# Patient Record
Sex: FEMALE | Race: WHITE | NOT HISPANIC OR LATINO | URBAN - METROPOLITAN AREA
[De-identification: names, ages, dates, MRNs, and addresses within clinical notes are randomized per-mention and may not be internally consistent; named-entity substitution may affect disease eponyms.]

---

## 2019-02-01 ENCOUNTER — OUTPATIENT (OUTPATIENT)
Dept: OUTPATIENT SERVICES | Facility: HOSPITAL | Age: 29
LOS: 1 days | End: 2019-02-01
Payer: COMMERCIAL

## 2019-02-01 ENCOUNTER — APPOINTMENT (OUTPATIENT)
Dept: ULTRASOUND IMAGING | Facility: CLINIC | Age: 29
End: 2019-02-01

## 2019-02-01 DIAGNOSIS — Z00.8 ENCOUNTER FOR OTHER GENERAL EXAMINATION: ICD-10-CM

## 2019-02-01 PROBLEM — Z00.00 ENCOUNTER FOR PREVENTIVE HEALTH EXAMINATION: Status: ACTIVE | Noted: 2019-02-01

## 2019-02-01 PROCEDURE — 76801 OB US < 14 WKS SINGLE FETUS: CPT | Mod: 26

## 2019-02-01 PROCEDURE — 76815 OB US LIMITED FETUS(S): CPT

## 2019-02-01 PROCEDURE — 76801 OB US < 14 WKS SINGLE FETUS: CPT

## 2019-02-01 PROCEDURE — 76817 TRANSVAGINAL US OBSTETRIC: CPT | Mod: 26

## 2019-02-01 PROCEDURE — 76817 TRANSVAGINAL US OBSTETRIC: CPT

## 2019-05-13 ENCOUNTER — OUTPATIENT (OUTPATIENT)
Dept: OUTPATIENT SERVICES | Facility: HOSPITAL | Age: 29
LOS: 1 days | End: 2019-05-13
Payer: COMMERCIAL

## 2019-05-13 ENCOUNTER — APPOINTMENT (OUTPATIENT)
Dept: ULTRASOUND IMAGING | Facility: CLINIC | Age: 29
End: 2019-05-13
Payer: COMMERCIAL

## 2019-05-13 DIAGNOSIS — Z00.8 ENCOUNTER FOR OTHER GENERAL EXAMINATION: ICD-10-CM

## 2019-05-13 PROCEDURE — 76856 US EXAM PELVIC COMPLETE: CPT

## 2019-05-13 PROCEDURE — 76830 TRANSVAGINAL US NON-OB: CPT

## 2019-05-13 PROCEDURE — 76830 TRANSVAGINAL US NON-OB: CPT | Mod: 26

## 2019-05-13 PROCEDURE — 76856 US EXAM PELVIC COMPLETE: CPT | Mod: 26

## 2019-05-16 ENCOUNTER — APPOINTMENT (OUTPATIENT)
Dept: OBGYN | Facility: CLINIC | Age: 29
End: 2019-05-16

## 2019-09-17 ENCOUNTER — OUTPATIENT (OUTPATIENT)
Dept: INPATIENT UNIT | Facility: HOSPITAL | Age: 29
LOS: 1 days | Discharge: HOME | End: 2019-09-17
Payer: COMMERCIAL

## 2019-09-17 ENCOUNTER — APPOINTMENT (OUTPATIENT)
Dept: ANTEPARTUM | Facility: CLINIC | Age: 29
End: 2019-09-17
Payer: COMMERCIAL

## 2019-09-17 ENCOUNTER — ASOB RESULT (OUTPATIENT)
Age: 29
End: 2019-09-17

## 2019-09-17 DIAGNOSIS — O26.899 OTHER SPECIFIED PREGNANCY RELATED CONDITIONS, UNSPECIFIED TRIMESTER: ICD-10-CM

## 2019-09-17 DIAGNOSIS — O47.9 FALSE LABOR, UNSPECIFIED: ICD-10-CM

## 2019-09-17 LAB
APPEARANCE UR: CLEAR — SIGNIFICANT CHANGE UP
BILIRUB UR-MCNC: NEGATIVE — SIGNIFICANT CHANGE UP
COLOR SPEC: YELLOW — SIGNIFICANT CHANGE UP
DIFF PNL FLD: NEGATIVE — SIGNIFICANT CHANGE UP
GLUCOSE UR QL: NEGATIVE MG/DL — SIGNIFICANT CHANGE UP
KETONES UR-MCNC: NEGATIVE — SIGNIFICANT CHANGE UP
LEUKOCYTE ESTERASE UR-ACNC: ABNORMAL
NITRITE UR-MCNC: NEGATIVE — SIGNIFICANT CHANGE UP
PH UR: 8 — SIGNIFICANT CHANGE UP (ref 5–8)
PROT UR-MCNC: NEGATIVE MG/DL — SIGNIFICANT CHANGE UP
SP GR SPEC: 1.01 — SIGNIFICANT CHANGE UP (ref 1.01–1.02)
UROBILINOGEN FLD QL: NEGATIVE MG/DL — SIGNIFICANT CHANGE UP

## 2019-09-17 PROCEDURE — G0463: CPT

## 2019-09-17 PROCEDURE — 76817 TRANSVAGINAL US OBSTETRIC: CPT

## 2019-09-17 PROCEDURE — 99213 OFFICE O/P EST LOW 20 MIN: CPT

## 2019-09-17 PROCEDURE — 87086 URINE CULTURE/COLONY COUNT: CPT

## 2019-09-17 PROCEDURE — 81001 URINALYSIS AUTO W/SCOPE: CPT

## 2019-09-17 PROCEDURE — 59025 FETAL NON-STRESS TEST: CPT

## 2019-09-18 LAB
CULTURE RESULTS: SIGNIFICANT CHANGE UP
SPECIMEN SOURCE: SIGNIFICANT CHANGE UP

## 2019-09-19 DIAGNOSIS — O26.899 OTHER SPECIFIED PREGNANCY RELATED CONDITIONS, UNSPECIFIED TRIMESTER: ICD-10-CM

## 2020-01-06 ENCOUNTER — INPATIENT (INPATIENT)
Facility: HOSPITAL | Age: 30
LOS: 2 days | Discharge: HOME | End: 2020-01-09
Attending: OBSTETRICS & GYNECOLOGY | Admitting: OBSTETRICS & GYNECOLOGY
Payer: COMMERCIAL

## 2020-01-06 VITALS — WEIGHT: 209.44 LBS

## 2020-01-06 DIAGNOSIS — Z34.90 ENCOUNTER FOR SUPERVISION OF NORMAL PREGNANCY, UNSPECIFIED, UNSPECIFIED TRIMESTER: ICD-10-CM

## 2020-01-06 PROCEDURE — 86780 TREPONEMA PALLIDUM: CPT

## 2020-01-06 PROCEDURE — 59050 FETAL MONITOR W/REPORT: CPT

## 2020-01-06 PROCEDURE — 85025 COMPLETE CBC W/AUTO DIFF WBC: CPT

## 2020-01-06 PROCEDURE — 85018 HEMOGLOBIN: CPT

## 2020-01-06 PROCEDURE — 85014 HEMATOCRIT: CPT

## 2020-01-06 PROCEDURE — 86900 BLOOD TYPING SEROLOGIC ABO: CPT

## 2020-01-06 PROCEDURE — 86901 BLOOD TYPING SEROLOGIC RH(D): CPT

## 2020-01-06 PROCEDURE — 94760 N-INVAS EAR/PLS OXIMETRY 1: CPT

## 2020-01-06 PROCEDURE — 86850 RBC ANTIBODY SCREEN: CPT

## 2020-01-06 PROCEDURE — 36415 COLL VENOUS BLD VENIPUNCTURE: CPT

## 2020-01-06 PROCEDURE — C1889: CPT

## 2020-01-06 RX ORDER — SODIUM CHLORIDE 9 MG/ML
1000 INJECTION, SOLUTION INTRAVENOUS
Refills: 0 | Status: DISCONTINUED | OUTPATIENT
Start: 2020-01-06 | End: 2020-01-07

## 2020-01-06 RX ORDER — OXYTOCIN 10 UNIT/ML
333.33 VIAL (ML) INJECTION
Qty: 20 | Refills: 0 | Status: COMPLETED | OUTPATIENT
Start: 2020-01-06 | End: 2020-01-07

## 2020-01-06 RX ORDER — CITRIC ACID/SODIUM CITRATE 300-500 MG
15 SOLUTION, ORAL ORAL EVERY 6 HOURS
Refills: 0 | Status: DISCONTINUED | OUTPATIENT
Start: 2020-01-06 | End: 2020-01-07

## 2020-01-06 RX ORDER — OXYTOCIN 10 UNIT/ML
2 VIAL (ML) INJECTION
Qty: 30 | Refills: 0 | Status: DISCONTINUED | OUTPATIENT
Start: 2020-01-06 | End: 2020-01-09

## 2020-01-06 RX ADMIN — SODIUM CHLORIDE 125 MILLILITER(S): 9 INJECTION, SOLUTION INTRAVENOUS at 22:59

## 2020-01-06 RX ADMIN — Medication 2 MILLIUNIT(S)/MIN: at 23:07

## 2020-01-07 LAB
BASOPHILS # BLD AUTO: 0.03 K/UL — SIGNIFICANT CHANGE UP (ref 0–0.2)
BASOPHILS NFR BLD AUTO: 0.3 % — SIGNIFICANT CHANGE UP (ref 0–2)
EOSINOPHIL # BLD AUTO: 0.13 K/UL — SIGNIFICANT CHANGE UP (ref 0–0.5)
EOSINOPHIL NFR BLD AUTO: 1.3 % — SIGNIFICANT CHANGE UP (ref 0–6)
HCT VFR BLD CALC: 33.6 % — LOW (ref 34.5–45)
HGB BLD-MCNC: 11.1 G/DL — LOW (ref 11.5–15.5)
IMM GRANULOCYTES NFR BLD AUTO: 0.4 % — SIGNIFICANT CHANGE UP (ref 0–1.5)
LYMPHOCYTES # BLD AUTO: 1.91 K/UL — SIGNIFICANT CHANGE UP (ref 1–3.3)
LYMPHOCYTES # BLD AUTO: 19.8 % — SIGNIFICANT CHANGE UP (ref 13–44)
MCHC RBC-ENTMCNC: 29.4 PG — SIGNIFICANT CHANGE UP (ref 27–34)
MCHC RBC-ENTMCNC: 33 GM/DL — SIGNIFICANT CHANGE UP (ref 32–36)
MCV RBC AUTO: 88.9 FL — SIGNIFICANT CHANGE UP (ref 80–100)
MONOCYTES # BLD AUTO: 0.77 K/UL — SIGNIFICANT CHANGE UP (ref 0–0.9)
MONOCYTES NFR BLD AUTO: 8 % — SIGNIFICANT CHANGE UP (ref 2–14)
NEUTROPHILS # BLD AUTO: 6.75 K/UL — SIGNIFICANT CHANGE UP (ref 1.8–7.4)
NEUTROPHILS NFR BLD AUTO: 70.2 % — SIGNIFICANT CHANGE UP (ref 43–77)
PLATELET # BLD AUTO: 333 K/UL — SIGNIFICANT CHANGE UP (ref 150–400)
RBC # BLD: 3.78 M/UL — LOW (ref 3.8–5.2)
RBC # FLD: 12.9 % — SIGNIFICANT CHANGE UP (ref 10.3–14.5)
T PALLIDUM AB TITR SER: NEGATIVE — SIGNIFICANT CHANGE UP
WBC # BLD: 9.63 K/UL — SIGNIFICANT CHANGE UP (ref 3.8–10.5)
WBC # FLD AUTO: 9.63 K/UL — SIGNIFICANT CHANGE UP (ref 3.8–10.5)

## 2020-01-07 RX ORDER — LANOLIN
1 OINTMENT (GRAM) TOPICAL EVERY 6 HOURS
Refills: 0 | Status: DISCONTINUED | OUTPATIENT
Start: 2020-01-07 | End: 2020-01-09

## 2020-01-07 RX ORDER — GLYCERIN ADULT
1 SUPPOSITORY, RECTAL RECTAL AT BEDTIME
Refills: 0 | Status: DISCONTINUED | OUTPATIENT
Start: 2020-01-07 | End: 2020-01-07

## 2020-01-07 RX ORDER — OXYCODONE HYDROCHLORIDE 5 MG/1
5 TABLET ORAL
Refills: 0 | Status: DISCONTINUED | OUTPATIENT
Start: 2020-01-07 | End: 2020-01-09

## 2020-01-07 RX ORDER — SIMETHICONE 80 MG/1
80 TABLET, CHEWABLE ORAL EVERY 4 HOURS
Refills: 0 | Status: DISCONTINUED | OUTPATIENT
Start: 2020-01-07 | End: 2020-01-09

## 2020-01-07 RX ORDER — MAGNESIUM HYDROXIDE 400 MG/1
30 TABLET, CHEWABLE ORAL
Refills: 0 | Status: DISCONTINUED | OUTPATIENT
Start: 2020-01-07 | End: 2020-01-09

## 2020-01-07 RX ORDER — DIBUCAINE 1 %
1 OINTMENT (GRAM) RECTAL EVERY 6 HOURS
Refills: 0 | Status: DISCONTINUED | OUTPATIENT
Start: 2020-01-07 | End: 2020-01-09

## 2020-01-07 RX ORDER — IBUPROFEN 200 MG
600 TABLET ORAL EVERY 6 HOURS
Refills: 0 | Status: COMPLETED | OUTPATIENT
Start: 2020-01-07 | End: 2020-12-05

## 2020-01-07 RX ORDER — SODIUM CHLORIDE 9 MG/ML
3 INJECTION INTRAMUSCULAR; INTRAVENOUS; SUBCUTANEOUS EVERY 8 HOURS
Refills: 0 | Status: DISCONTINUED | OUTPATIENT
Start: 2020-01-07 | End: 2020-01-09

## 2020-01-07 RX ORDER — BENZOCAINE 10 %
1 GEL (GRAM) MUCOUS MEMBRANE EVERY 6 HOURS
Refills: 0 | Status: DISCONTINUED | OUTPATIENT
Start: 2020-01-07 | End: 2020-01-09

## 2020-01-07 RX ORDER — AER TRAVELER 0.5 G/1
1 SOLUTION RECTAL; TOPICAL EVERY 4 HOURS
Refills: 0 | Status: DISCONTINUED | OUTPATIENT
Start: 2020-01-07 | End: 2020-01-09

## 2020-01-07 RX ORDER — PRAMOXINE HYDROCHLORIDE 150 MG/15G
1 AEROSOL, FOAM RECTAL EVERY 4 HOURS
Refills: 0 | Status: DISCONTINUED | OUTPATIENT
Start: 2020-01-07 | End: 2020-01-09

## 2020-01-07 RX ORDER — ACETAMINOPHEN 500 MG
975 TABLET ORAL
Refills: 0 | Status: DISCONTINUED | OUTPATIENT
Start: 2020-01-07 | End: 2020-01-09

## 2020-01-07 RX ORDER — IBUPROFEN 200 MG
600 TABLET ORAL EVERY 6 HOURS
Refills: 0 | Status: DISCONTINUED | OUTPATIENT
Start: 2020-01-07 | End: 2020-01-09

## 2020-01-07 RX ORDER — LEVOTHYROXINE SODIUM 125 MCG
175 TABLET ORAL DAILY
Refills: 0 | Status: DISCONTINUED | OUTPATIENT
Start: 2020-01-07 | End: 2020-01-09

## 2020-01-07 RX ORDER — TETANUS TOXOID, REDUCED DIPHTHERIA TOXOID AND ACELLULAR PERTUSSIS VACCINE, ADSORBED 5; 2.5; 8; 8; 2.5 [IU]/.5ML; [IU]/.5ML; UG/.5ML; UG/.5ML; UG/.5ML
0.5 SUSPENSION INTRAMUSCULAR ONCE
Refills: 0 | Status: DISCONTINUED | OUTPATIENT
Start: 2020-01-07 | End: 2020-01-09

## 2020-01-07 RX ORDER — HYDROCORTISONE 1 %
1 OINTMENT (GRAM) TOPICAL EVERY 6 HOURS
Refills: 0 | Status: DISCONTINUED | OUTPATIENT
Start: 2020-01-07 | End: 2020-01-09

## 2020-01-07 RX ORDER — KETOROLAC TROMETHAMINE 30 MG/ML
30 SYRINGE (ML) INJECTION ONCE
Refills: 0 | Status: DISCONTINUED | OUTPATIENT
Start: 2020-01-07 | End: 2020-01-07

## 2020-01-07 RX ORDER — DIPHENHYDRAMINE HCL 50 MG
25 CAPSULE ORAL EVERY 6 HOURS
Refills: 0 | Status: DISCONTINUED | OUTPATIENT
Start: 2020-01-07 | End: 2020-01-09

## 2020-01-07 RX ORDER — OXYCODONE HYDROCHLORIDE 5 MG/1
5 TABLET ORAL ONCE
Refills: 0 | Status: DISCONTINUED | OUTPATIENT
Start: 2020-01-07 | End: 2020-01-09

## 2020-01-07 RX ADMIN — Medication 975 MILLIGRAM(S): at 18:23

## 2020-01-07 RX ADMIN — Medication 175 MICROGRAM(S): at 16:59

## 2020-01-07 RX ADMIN — Medication 975 MILLIGRAM(S): at 19:15

## 2020-01-07 RX ADMIN — Medication 30 MILLIGRAM(S): at 16:18

## 2020-01-07 RX ADMIN — SODIUM CHLORIDE 3 MILLILITER(S): 9 INJECTION INTRAMUSCULAR; INTRAVENOUS; SUBCUTANEOUS at 22:00

## 2020-01-07 RX ADMIN — Medication 1000 MILLIUNIT(S)/MIN: at 14:29

## 2020-01-08 ENCOUNTER — TRANSCRIPTION ENCOUNTER (OUTPATIENT)
Age: 30
End: 2020-01-08

## 2020-01-08 LAB
HCT VFR BLD CALC: 33.5 % — LOW (ref 34.5–45)
HGB BLD-MCNC: 11.2 G/DL — LOW (ref 11.5–15.5)

## 2020-01-08 RX ORDER — DIBUCAINE 1 %
1 OINTMENT (GRAM) RECTAL
Qty: 0 | Refills: 0 | DISCHARGE
Start: 2020-01-08

## 2020-01-08 RX ORDER — ACETAMINOPHEN 500 MG
3 TABLET ORAL
Qty: 0 | Refills: 0 | DISCHARGE
Start: 2020-01-08

## 2020-01-08 RX ORDER — LEVOTHYROXINE SODIUM 125 MCG
1 TABLET ORAL
Qty: 0 | Refills: 0 | DISCHARGE
Start: 2020-01-08

## 2020-01-08 RX ORDER — BENZOCAINE 10 %
1 GEL (GRAM) MUCOUS MEMBRANE
Qty: 0 | Refills: 0 | DISCHARGE
Start: 2020-01-08

## 2020-01-08 RX ORDER — LANOLIN
1 OINTMENT (GRAM) TOPICAL
Qty: 0 | Refills: 0 | DISCHARGE
Start: 2020-01-08

## 2020-01-08 RX ORDER — IBUPROFEN 200 MG
1 TABLET ORAL
Qty: 0 | Refills: 0 | DISCHARGE
Start: 2020-01-08

## 2020-01-08 RX ORDER — AER TRAVELER 0.5 G/1
1 SOLUTION RECTAL; TOPICAL
Qty: 0 | Refills: 0 | DISCHARGE
Start: 2020-01-08

## 2020-01-08 RX ADMIN — Medication 1 TABLET(S): at 12:10

## 2020-01-08 RX ADMIN — Medication 600 MILLIGRAM(S): at 12:10

## 2020-01-08 RX ADMIN — Medication 600 MILLIGRAM(S): at 00:41

## 2020-01-08 RX ADMIN — Medication 175 MICROGRAM(S): at 06:47

## 2020-01-08 RX ADMIN — Medication 975 MILLIGRAM(S): at 09:30

## 2020-01-08 RX ADMIN — Medication 975 MILLIGRAM(S): at 21:11

## 2020-01-08 RX ADMIN — Medication 600 MILLIGRAM(S): at 06:48

## 2020-01-08 RX ADMIN — Medication 600 MILLIGRAM(S): at 07:30

## 2020-01-08 RX ADMIN — Medication 600 MILLIGRAM(S): at 18:04

## 2020-01-08 RX ADMIN — Medication 600 MILLIGRAM(S): at 13:00

## 2020-01-08 RX ADMIN — Medication 600 MILLIGRAM(S): at 01:35

## 2020-01-08 RX ADMIN — Medication 975 MILLIGRAM(S): at 03:20

## 2020-01-08 RX ADMIN — Medication 975 MILLIGRAM(S): at 02:25

## 2020-01-08 RX ADMIN — Medication 975 MILLIGRAM(S): at 22:10

## 2020-01-08 RX ADMIN — Medication 975 MILLIGRAM(S): at 15:21

## 2020-01-08 RX ADMIN — Medication 975 MILLIGRAM(S): at 08:46

## 2020-01-08 NOTE — DISCHARGE NOTE OB - PATIENT PORTAL LINK FT
You can access the FollowMyHealth Patient Portal offered by [facility] by registering at the following website: [email or web address]. By joining [email or web address]’s FollowMyHealth portal, you will also be able to view your health information using other applications (apps) compatible with our system.

## 2020-01-08 NOTE — DISCHARGE NOTE OB - CARE PLAN
Principal Discharge DX:	Vaginal delivery  Goal:	healthy mommy helathy baby  Assessment and plan of treatment:	hydrate pelvic rest

## 2020-01-08 NOTE — DISCHARGE NOTE OB - MEDICATION SUMMARY - MEDICATIONS TO TAKE
I will START or STAY ON the medications listed below when I get home from the hospital:    acetaminophen 325 mg oral tablet  -- 3 tab(s) by mouth   -- Indication: For Encounter for supervision of normal pregnancy, antepartum    ibuprofen 600 mg oral tablet  -- 1 tab(s) by mouth every 6 hours  -- Indication: For Encounter for supervision of normal pregnancy, antepartum    benzocaine 20% topical spray  -- 1 spray(s) on skin every 6 hours, As needed, for Perineal discomfort  -- Indication: For Encounter for supervision of normal pregnancy, antepartum    dibucaine 1% topical ointment  -- 1 application on skin every 6 hours, As needed, Perineal discomfort  -- Indication: For Encounter for supervision of normal pregnancy, antepartum    lanolin topical ointment  -- 1 application on skin every 6 hours, As needed, nipple soreness  -- Indication: For Encounter for supervision of normal pregnancy, antepartum    witch hazel 50% topical pad  -- 1 application on skin every 4 hours, As needed, Perineal discomfort  -- Indication: For Encounter for supervision of normal pregnancy, antepartum    Prenatal Multivitamins with Folic Acid 1 mg oral tablet  -- 1 tab(s) by mouth once a day  -- Indication: For Encounter for supervision of normal pregnancy, antepartum    levothyroxine 175 mcg (0.175 mg) oral tablet  -- 1 tab(s) by mouth once a day  -- Indication: For Encounter for supervision of normal pregnancy, antepartum

## 2020-01-08 NOTE — PROGRESS NOTE ADULT - SUBJECTIVE AND OBJECTIVE BOX
29y  Female PPD#1  .   S: Patient is doing well and has no acute complaints. Pain is well controlled with medications. Patient is tolerating regular diet. She is OOB and urinating w/o any difficulties.  Denies bowel movement at this time.    O: Vital Signs Last 24 Hrs  T(C): 36.7 (2020 08:55), Max: 37.6 (2020 18:10)  T(F): 98.1 (2020 08:55), Max: 99.6 (2020 18:10)  HR: 73 (2020 08:55) (66 - 102)  BP: 105/64 (2020 08:55) (105/64 - 119/63)  RR: 16 (2020 08:55) (13 - 16)  SpO2: 100% (2020 08:55) (100% - 100%)  Appears well, resting in bed  Abdo: Fundus firm w/o tenderness   PV: No calf tenderness, edema.     Labs:                         11.2   x     )-----------( x        ( 2020 07:23 )             33.5

## 2020-01-08 NOTE — DISCHARGE NOTE OB - CARE PROVIDER_API CALL
Storm Arnold)  Internal Medicine  [address] [place] [address]  Phone: [phone]  Fax: [phone]  Follow Up Time: 1 month

## 2020-01-09 VITALS
RESPIRATION RATE: 16 BRPM | DIASTOLIC BLOOD PRESSURE: 77 MMHG | SYSTOLIC BLOOD PRESSURE: 126 MMHG | TEMPERATURE: 98 F | HEART RATE: 69 BPM | OXYGEN SATURATION: 99 %

## 2020-01-09 RX ADMIN — Medication 975 MILLIGRAM(S): at 03:28

## 2020-01-09 RX ADMIN — Medication 975 MILLIGRAM(S): at 09:21

## 2020-01-09 RX ADMIN — Medication 975 MILLIGRAM(S): at 04:02

## 2020-01-09 RX ADMIN — Medication 975 MILLIGRAM(S): at 10:21

## 2020-01-09 RX ADMIN — Medication 600 MILLIGRAM(S): at 06:40

## 2020-01-09 RX ADMIN — Medication 600 MILLIGRAM(S): at 01:10

## 2020-01-09 RX ADMIN — Medication 600 MILLIGRAM(S): at 00:12

## 2020-01-09 RX ADMIN — Medication 175 MICROGRAM(S): at 06:40

## 2020-01-09 NOTE — PROGRESS NOTE ADULT - SUBJECTIVE AND OBJECTIVE BOX
S: Patient doing well. Minimal lochia. No complaints.     O: Vital Signs Last 24 Hrs  T(C): 36.8 (2020 07:40), Max: 36.8 (2020 20:04)  T(F): 98.2 (2020 07:40), Max: 98.2 (2020 20:04)  HR: 69 (2020 07:40) (69 - 75)  BP: 126/77 (2020 07:40) (105/64 - 126/77)  BP(mean): --  RR: 16 (2020 07:40) (16 - 16)  SpO2: 99% (2020 07:40) (99% - 100%)    Gen: NAD  Abd: soft, NT, ND, fundus firm below umbilicus  Ext: no tenderness +2 edema    Labs:                        11.2   x     )-----------( x        ( 2020 07:23 )             33.5          A: 29y PPD# 2 s/p      Doing well  breastfeeding  Plan:   Discharge home.  verbal discharge instructions d/w patient  Nothing in vagina and no heavy lifting for 6 weeks.   Follow up 6 weeks for post partum visit.  Call office for any fevers, chills, heavy vaginal bleeding, symptoms of depression, or any other concerning symptoms.  Continue motrin 600 mg every 6 hours.    s/p circ

## 2020-01-11 DIAGNOSIS — O36.8130 DECREASED FETAL MOVEMENTS, THIRD TRIMESTER, NOT APPLICABLE OR UNSPECIFIED: ICD-10-CM

## 2020-01-11 DIAGNOSIS — Z3A.39 39 WEEKS GESTATION OF PREGNANCY: ICD-10-CM

## 2021-07-05 ENCOUNTER — INPATIENT (INPATIENT)
Facility: HOSPITAL | Age: 31
LOS: 1 days | Discharge: HOME | End: 2021-07-07
Attending: OBSTETRICS & GYNECOLOGY | Admitting: OBSTETRICS & GYNECOLOGY
Payer: COMMERCIAL

## 2021-07-05 ENCOUNTER — TRANSCRIPTION ENCOUNTER (OUTPATIENT)
Age: 31
End: 2021-07-05

## 2021-07-05 VITALS — WEIGHT: 185.19 LBS | HEIGHT: 64 IN

## 2021-07-05 DIAGNOSIS — Z3A.00 WEEKS OF GESTATION OF PREGNANCY NOT SPECIFIED: ICD-10-CM

## 2021-07-05 LAB
ALBUMIN SERPL ELPH-MCNC: 2.8 G/DL — LOW (ref 3.3–5)
ALP SERPL-CCNC: 112 U/L — SIGNIFICANT CHANGE UP (ref 40–120)
ALT FLD-CCNC: 12 U/L — SIGNIFICANT CHANGE UP (ref 12–78)
ANION GAP SERPL CALC-SCNC: 8 MMOL/L — SIGNIFICANT CHANGE UP (ref 5–17)
AST SERPL-CCNC: 18 U/L — SIGNIFICANT CHANGE UP (ref 15–37)
BASOPHILS # BLD AUTO: 0.02 K/UL — SIGNIFICANT CHANGE UP (ref 0–0.2)
BASOPHILS NFR BLD AUTO: 0.2 % — SIGNIFICANT CHANGE UP (ref 0–2)
BILIRUB SERPL-MCNC: 0.2 MG/DL — SIGNIFICANT CHANGE UP (ref 0.2–1.2)
BUN SERPL-MCNC: 18 MG/DL — SIGNIFICANT CHANGE UP (ref 7–23)
CALCIUM SERPL-MCNC: 8.7 MG/DL — SIGNIFICANT CHANGE UP (ref 8.5–10.1)
CHLORIDE SERPL-SCNC: 106 MMOL/L — SIGNIFICANT CHANGE UP (ref 96–108)
CO2 SERPL-SCNC: 23 MMOL/L — SIGNIFICANT CHANGE UP (ref 22–31)
COVID-19 SPIKE DOMAIN AB INTERP: NEGATIVE — SIGNIFICANT CHANGE UP
COVID-19 SPIKE DOMAIN ANTIBODY RESULT: 0.4 U/ML — SIGNIFICANT CHANGE UP
CREAT SERPL-MCNC: 0.73 MG/DL — SIGNIFICANT CHANGE UP (ref 0.5–1.3)
EOSINOPHIL # BLD AUTO: 0.07 K/UL — SIGNIFICANT CHANGE UP (ref 0–0.5)
EOSINOPHIL NFR BLD AUTO: 0.8 % — SIGNIFICANT CHANGE UP (ref 0–6)
GLUCOSE SERPL-MCNC: 78 MG/DL — SIGNIFICANT CHANGE UP (ref 70–99)
HCT VFR BLD CALC: 35.4 % — SIGNIFICANT CHANGE UP (ref 34.5–45)
HGB BLD-MCNC: 12.3 G/DL — SIGNIFICANT CHANGE UP (ref 11.5–15.5)
IMM GRANULOCYTES NFR BLD AUTO: 0.6 % — SIGNIFICANT CHANGE UP (ref 0–1.5)
LYMPHOCYTES # BLD AUTO: 2.15 K/UL — SIGNIFICANT CHANGE UP (ref 1–3.3)
LYMPHOCYTES # BLD AUTO: 23.8 % — SIGNIFICANT CHANGE UP (ref 13–44)
MCHC RBC-ENTMCNC: 30.9 PG — SIGNIFICANT CHANGE UP (ref 27–34)
MCHC RBC-ENTMCNC: 34.7 GM/DL — SIGNIFICANT CHANGE UP (ref 32–36)
MCV RBC AUTO: 88.9 FL — SIGNIFICANT CHANGE UP (ref 80–100)
MONOCYTES # BLD AUTO: 0.68 K/UL — SIGNIFICANT CHANGE UP (ref 0–0.9)
MONOCYTES NFR BLD AUTO: 7.5 % — SIGNIFICANT CHANGE UP (ref 2–14)
NEUTROPHILS # BLD AUTO: 6.07 K/UL — SIGNIFICANT CHANGE UP (ref 1.8–7.4)
NEUTROPHILS NFR BLD AUTO: 67.1 % — SIGNIFICANT CHANGE UP (ref 43–77)
PLATELET # BLD AUTO: 274 K/UL — SIGNIFICANT CHANGE UP (ref 150–400)
POTASSIUM SERPL-MCNC: 3.7 MMOL/L — SIGNIFICANT CHANGE UP (ref 3.5–5.3)
POTASSIUM SERPL-SCNC: 3.7 MMOL/L — SIGNIFICANT CHANGE UP (ref 3.5–5.3)
PROT SERPL-MCNC: 6.6 GM/DL — SIGNIFICANT CHANGE UP (ref 6–8.3)
RBC # BLD: 3.98 M/UL — SIGNIFICANT CHANGE UP (ref 3.8–5.2)
RBC # FLD: 12.8 % — SIGNIFICANT CHANGE UP (ref 10.3–14.5)
SARS-COV-2 IGG+IGM SERPL QL IA: 0.4 U/ML — SIGNIFICANT CHANGE UP
SARS-COV-2 IGG+IGM SERPL QL IA: NEGATIVE — SIGNIFICANT CHANGE UP
SARS-COV-2 RNA SPEC QL NAA+PROBE: SIGNIFICANT CHANGE UP
SODIUM SERPL-SCNC: 137 MMOL/L — SIGNIFICANT CHANGE UP (ref 135–145)
T PALLIDUM AB TITR SER: NEGATIVE — SIGNIFICANT CHANGE UP
WBC # BLD: 9.04 K/UL — SIGNIFICANT CHANGE UP (ref 3.8–10.5)
WBC # FLD AUTO: 9.04 K/UL — SIGNIFICANT CHANGE UP (ref 3.8–10.5)

## 2021-07-05 PROCEDURE — 86901 BLOOD TYPING SEROLOGIC RH(D): CPT

## 2021-07-05 PROCEDURE — U0003: CPT

## 2021-07-05 PROCEDURE — 86850 RBC ANTIBODY SCREEN: CPT

## 2021-07-05 PROCEDURE — 80053 COMPREHEN METABOLIC PANEL: CPT

## 2021-07-05 PROCEDURE — C1889: CPT

## 2021-07-05 PROCEDURE — 59050 FETAL MONITOR W/REPORT: CPT

## 2021-07-05 PROCEDURE — 85014 HEMATOCRIT: CPT

## 2021-07-05 PROCEDURE — 86780 TREPONEMA PALLIDUM: CPT

## 2021-07-05 PROCEDURE — 36415 COLL VENOUS BLD VENIPUNCTURE: CPT

## 2021-07-05 PROCEDURE — 86900 BLOOD TYPING SEROLOGIC ABO: CPT

## 2021-07-05 PROCEDURE — U0005: CPT

## 2021-07-05 PROCEDURE — 86769 SARS-COV-2 COVID-19 ANTIBODY: CPT

## 2021-07-05 PROCEDURE — 85461 HEMOGLOBIN FETAL: CPT

## 2021-07-05 PROCEDURE — 85025 COMPLETE CBC W/AUTO DIFF WBC: CPT

## 2021-07-05 PROCEDURE — 94760 N-INVAS EAR/PLS OXIMETRY 1: CPT

## 2021-07-05 PROCEDURE — 85018 HEMOGLOBIN: CPT

## 2021-07-05 PROCEDURE — G0463: CPT

## 2021-07-05 RX ORDER — CITRIC ACID/SODIUM CITRATE 300-500 MG
15 SOLUTION, ORAL ORAL EVERY 6 HOURS
Refills: 0 | Status: DISCONTINUED | OUTPATIENT
Start: 2021-07-05 | End: 2021-07-05

## 2021-07-05 RX ORDER — KETOROLAC TROMETHAMINE 30 MG/ML
30 SYRINGE (ML) INJECTION ONCE
Refills: 0 | Status: DISCONTINUED | OUTPATIENT
Start: 2021-07-05 | End: 2021-07-05

## 2021-07-05 RX ORDER — SIMETHICONE 80 MG/1
80 TABLET, CHEWABLE ORAL EVERY 4 HOURS
Refills: 0 | Status: DISCONTINUED | OUTPATIENT
Start: 2021-07-05 | End: 2021-07-07

## 2021-07-05 RX ORDER — LANOLIN
1 OINTMENT (GRAM) TOPICAL EVERY 6 HOURS
Refills: 0 | Status: DISCONTINUED | OUTPATIENT
Start: 2021-07-05 | End: 2021-07-07

## 2021-07-05 RX ORDER — IBUPROFEN 200 MG
600 TABLET ORAL EVERY 6 HOURS
Refills: 0 | Status: DISCONTINUED | OUTPATIENT
Start: 2021-07-05 | End: 2021-07-07

## 2021-07-05 RX ORDER — BENZOCAINE 10 %
1 GEL (GRAM) MUCOUS MEMBRANE EVERY 6 HOURS
Refills: 0 | Status: DISCONTINUED | OUTPATIENT
Start: 2021-07-05 | End: 2021-07-07

## 2021-07-05 RX ORDER — TETANUS TOXOID, REDUCED DIPHTHERIA TOXOID AND ACELLULAR PERTUSSIS VACCINE, ADSORBED 5; 2.5; 8; 8; 2.5 [IU]/.5ML; [IU]/.5ML; UG/.5ML; UG/.5ML; UG/.5ML
0.5 SUSPENSION INTRAMUSCULAR ONCE
Refills: 0 | Status: DISCONTINUED | OUTPATIENT
Start: 2021-07-05 | End: 2021-07-07

## 2021-07-05 RX ORDER — HYDROCORTISONE 1 %
1 OINTMENT (GRAM) TOPICAL EVERY 6 HOURS
Refills: 0 | Status: DISCONTINUED | OUTPATIENT
Start: 2021-07-05 | End: 2021-07-07

## 2021-07-05 RX ORDER — OXYCODONE HYDROCHLORIDE 5 MG/1
5 TABLET ORAL ONCE
Refills: 0 | Status: DISCONTINUED | OUTPATIENT
Start: 2021-07-05 | End: 2021-07-07

## 2021-07-05 RX ORDER — OXYTOCIN 10 UNIT/ML
VIAL (ML) INJECTION
Qty: 30 | Refills: 0 | Status: DISCONTINUED | OUTPATIENT
Start: 2021-07-05 | End: 2021-07-05

## 2021-07-05 RX ORDER — ACETAMINOPHEN 500 MG
975 TABLET ORAL
Refills: 0 | Status: DISCONTINUED | OUTPATIENT
Start: 2021-07-05 | End: 2021-07-07

## 2021-07-05 RX ORDER — SODIUM CHLORIDE 9 MG/ML
3 INJECTION INTRAMUSCULAR; INTRAVENOUS; SUBCUTANEOUS EVERY 8 HOURS
Refills: 0 | Status: DISCONTINUED | OUTPATIENT
Start: 2021-07-05 | End: 2021-07-06

## 2021-07-05 RX ORDER — AER TRAVELER 0.5 G/1
1 SOLUTION RECTAL; TOPICAL EVERY 4 HOURS
Refills: 0 | Status: DISCONTINUED | OUTPATIENT
Start: 2021-07-05 | End: 2021-07-07

## 2021-07-05 RX ORDER — OXYTOCIN 10 UNIT/ML
333.33 VIAL (ML) INJECTION
Qty: 20 | Refills: 0 | Status: DISCONTINUED | OUTPATIENT
Start: 2021-07-05 | End: 2021-07-07

## 2021-07-05 RX ORDER — MAGNESIUM HYDROXIDE 400 MG/1
30 TABLET, CHEWABLE ORAL
Refills: 0 | Status: DISCONTINUED | OUTPATIENT
Start: 2021-07-05 | End: 2021-07-07

## 2021-07-05 RX ORDER — PRAMOXINE HYDROCHLORIDE 150 MG/15G
1 AEROSOL, FOAM RECTAL EVERY 4 HOURS
Refills: 0 | Status: DISCONTINUED | OUTPATIENT
Start: 2021-07-05 | End: 2021-07-07

## 2021-07-05 RX ORDER — DIBUCAINE 1 %
1 OINTMENT (GRAM) RECTAL EVERY 6 HOURS
Refills: 0 | Status: DISCONTINUED | OUTPATIENT
Start: 2021-07-05 | End: 2021-07-07

## 2021-07-05 RX ORDER — OXYCODONE HYDROCHLORIDE 5 MG/1
5 TABLET ORAL
Refills: 0 | Status: DISCONTINUED | OUTPATIENT
Start: 2021-07-05 | End: 2021-07-07

## 2021-07-05 RX ORDER — IBUPROFEN 200 MG
600 TABLET ORAL EVERY 6 HOURS
Refills: 0 | Status: COMPLETED | OUTPATIENT
Start: 2021-07-05 | End: 2022-06-03

## 2021-07-05 RX ORDER — SODIUM CHLORIDE 9 MG/ML
1000 INJECTION, SOLUTION INTRAVENOUS
Refills: 0 | Status: DISCONTINUED | OUTPATIENT
Start: 2021-07-05 | End: 2021-07-05

## 2021-07-05 RX ORDER — DIPHENHYDRAMINE HCL 50 MG
25 CAPSULE ORAL EVERY 6 HOURS
Refills: 0 | Status: DISCONTINUED | OUTPATIENT
Start: 2021-07-05 | End: 2021-07-07

## 2021-07-05 RX ADMIN — SODIUM CHLORIDE 3 MILLILITER(S): 9 INJECTION INTRAMUSCULAR; INTRAVENOUS; SUBCUTANEOUS at 22:52

## 2021-07-05 RX ADMIN — Medication 1000 MILLIUNIT(S)/MIN: at 14:36

## 2021-07-05 RX ADMIN — SODIUM CHLORIDE 125 MILLILITER(S): 9 INJECTION, SOLUTION INTRAVENOUS at 11:54

## 2021-07-05 RX ADMIN — Medication 30 MILLIGRAM(S): at 17:00

## 2021-07-05 RX ADMIN — Medication 2 MILLIUNIT(S)/MIN: at 09:14

## 2021-07-05 RX ADMIN — SODIUM CHLORIDE 125 MILLILITER(S): 9 INJECTION, SOLUTION INTRAVENOUS at 07:56

## 2021-07-05 RX ADMIN — SODIUM CHLORIDE 125 MILLILITER(S): 9 INJECTION, SOLUTION INTRAVENOUS at 10:54

## 2021-07-05 RX ADMIN — Medication 975 MILLIGRAM(S): at 21:50

## 2021-07-05 RX ADMIN — Medication 30 MILLIGRAM(S): at 16:32

## 2021-07-05 RX ADMIN — Medication 975 MILLIGRAM(S): at 21:20

## 2021-07-05 RX ADMIN — Medication 600 MILLIGRAM(S): at 23:51

## 2021-07-06 LAB
HCT VFR BLD CALC: 36.8 % — SIGNIFICANT CHANGE UP (ref 34.5–45)
HGB BLD-MCNC: 12.5 G/DL — SIGNIFICANT CHANGE UP (ref 11.5–15.5)

## 2021-07-06 RX ORDER — LEVOTHYROXINE SODIUM 125 MCG
1 TABLET ORAL
Qty: 0 | Refills: 0 | DISCHARGE

## 2021-07-06 RX ORDER — IBUPROFEN 200 MG
1 TABLET ORAL
Qty: 20 | Refills: 0
Start: 2021-07-06 | End: 2021-07-10

## 2021-07-06 RX ORDER — LEVOTHYROXINE SODIUM 125 MCG
1 TABLET ORAL
Qty: 30 | Refills: 0
Start: 2021-07-06 | End: 2021-08-04

## 2021-07-06 RX ORDER — ACETAMINOPHEN 500 MG
3 TABLET ORAL
Qty: 60 | Refills: 0
Start: 2021-07-06 | End: 2021-07-10

## 2021-07-06 RX ORDER — LEVOTHYROXINE SODIUM 125 MCG
175 TABLET ORAL DAILY
Refills: 0 | Status: DISCONTINUED | OUTPATIENT
Start: 2021-07-06 | End: 2021-07-07

## 2021-07-06 RX ADMIN — Medication 600 MILLIGRAM(S): at 18:37

## 2021-07-06 RX ADMIN — Medication 975 MILLIGRAM(S): at 14:25

## 2021-07-06 RX ADMIN — Medication 975 MILLIGRAM(S): at 03:46

## 2021-07-06 RX ADMIN — Medication 600 MILLIGRAM(S): at 00:21

## 2021-07-06 RX ADMIN — Medication 975 MILLIGRAM(S): at 15:25

## 2021-07-06 RX ADMIN — Medication 975 MILLIGRAM(S): at 04:16

## 2021-07-06 RX ADMIN — Medication 600 MILLIGRAM(S): at 06:30

## 2021-07-06 RX ADMIN — Medication 975 MILLIGRAM(S): at 21:21

## 2021-07-06 RX ADMIN — Medication 1 TABLET(S): at 15:26

## 2021-07-06 RX ADMIN — Medication 175 MICROGRAM(S): at 06:30

## 2021-07-06 RX ADMIN — Medication 975 MILLIGRAM(S): at 20:51

## 2021-07-06 NOTE — DISCHARGE NOTE OB - CARE PLAN
Principal Discharge DX:	 (normal spontaneous vaginal delivery)  Goal:	Rapid recovery  Assessment and plan of treatment:	Patient should transition to regular activity level. Resume regular diet. Patient should follow up with her OB for postpartum checkup in 2-3 weeks. Patient should call her doctor sooner if she develops fever or uncontrolled vaginal bleeding. Take medications as directed. Exclusive breast feeding for the first 6 months is recommended. Nothing per vagina for 6 weeks (incl. sex, douching, etc). If you have additional concerns, please inform your provider.  Secondary Diagnosis:	Hypothyroidism, unspecified type  Goal:	Rapid recovery  Assessment and plan of treatment:	Continue home synthroid

## 2021-07-06 NOTE — DISCHARGE NOTE OB - PATIENT PORTAL LINK FT
You can access the FollowMyHealth Patient Portal offered by [facility] by registering at the following website: [email or web address]. By joining [email or web address]’s FollowMyHealth portal, you will also be able to view your health information using other applications (apps) compatible with our system.

## 2021-07-06 NOTE — DISCHARGE NOTE OB - MEDICATION SUMMARY - MEDICATIONS TO TAKE
I will START or STAY ON the medications listed below when I get home from the hospital:    acetaminophen 325 mg oral tablet  -- 3 tab(s) by mouth every 6 hours   -- Indication: For as needed for pain    ibuprofen 600 mg oral tablet  -- 1 tab(s) by mouth every 6 hours  -- Indication: For as needed for pain    Synthroid 175 mcg (0.175 mg) oral tablet  -- 1 tab(s) by mouth once a day  -- Indication: For continue home med

## 2021-07-06 NOTE — DISCHARGE NOTE OB - PLAN OF CARE
Rapid recovery Patient should transition to regular activity level. Resume regular diet. Patient should follow up with her OB for postpartum checkup in 2-3 weeks. Patient should call her doctor sooner if she develops fever or uncontrolled vaginal bleeding. Take medications as directed. Exclusive breast feeding for the first 6 months is recommended. Nothing per vagina for 6 weeks (incl. sex, douching, etc). If you have additional concerns, please inform your provider. Continue home synthroid

## 2021-07-06 NOTE — DISCHARGE NOTE OB - CARE PROVIDER_API CALL
Qi Arnold (MD)  Obstetrics and Gynecology  [address] [place] [address]  Phone: [phone]  Fax: [phone]  Established Patient  Follow Up Time: Routine

## 2021-07-07 VITALS
TEMPERATURE: 97 F | SYSTOLIC BLOOD PRESSURE: 110 MMHG | OXYGEN SATURATION: 100 % | RESPIRATION RATE: 16 BRPM | HEART RATE: 100 BPM | DIASTOLIC BLOOD PRESSURE: 76 MMHG

## 2021-07-07 RX ADMIN — Medication 600 MILLIGRAM(S): at 00:45

## 2021-07-07 RX ADMIN — Medication 975 MILLIGRAM(S): at 09:10

## 2021-07-07 RX ADMIN — Medication 975 MILLIGRAM(S): at 02:52

## 2021-07-07 RX ADMIN — Medication 600 MILLIGRAM(S): at 06:48

## 2021-07-07 RX ADMIN — Medication 600 MILLIGRAM(S): at 06:18

## 2021-07-07 RX ADMIN — Medication 600 MILLIGRAM(S): at 00:15

## 2021-07-07 RX ADMIN — Medication 175 MICROGRAM(S): at 06:18

## 2021-07-07 RX ADMIN — Medication 1 TABLET(S): at 09:10

## 2021-07-07 RX ADMIN — Medication 975 MILLIGRAM(S): at 03:22

## 2021-07-07 NOTE — PROGRESS NOTE ADULT - SUBJECTIVE AND OBJECTIVE BOX
S: Patient doing well. Minimal lochia. Pain controlled.    O: Vital Signs Last 24 Hrs  T(C): 36.3 (2021 08:06), Max: 36.7 (2021 19:30)  T(F): 97.3 (2021 08:06), Max: 98 (2021 19:30)  HR: 100 (2021 08:06) (60 - 100)  BP: 110/76 (2021 08:06) (102/66 - 110/76)  BP(mean): --  RR: 16 (2021 08:06) (16 - 16)  SpO2: 100% (2021 08:06) (100% - 100%)    Gen: NAD  Abd: soft, NT, ND, fundus firm below umbilicus  Lochia: moderate  Ext: no tenderness    Labs:                        12.5   x     )-----------( x        ( 2021 06:33 )             36.8       A: 31y PPD#2 s/p  doing well.    Plan: Stable for discharge home.
  31y  Female  PPD# 1 .   S: Patient is doing well and has no acute complaints. Pain is well controlled with medications. Patient is tolerating regular diet. She is OOB and urinating w/o any difficulties.      O: Vital Signs Last 24 Hrs  T(C): 36.5 (2021 20:20), Max: 37.2 (2021 16:15)  T(F): 97.7 (2021 20:20), Max: 99 (2021 16:15)  HR: 82 (2021 20:20) (53 - 117)  BP: 113/71 (2021 20:20) (102/64 - 118/62)  BP(mean): 76 (2021 15:30) (76 - 90)  RR: 18 (2021 20:20) (16 - 18)  SpO2: 100% (2021 20:20) (100% - 100%)  Appears well, resting in bed  Abdo: Fundus firm w/o tenderness   Perineum: healing well  PV: No calf tenderness, edema.     Labs:                         12.5   x     )-----------( x        ( 2021 06:33 )             36.8

## 2021-07-08 DIAGNOSIS — Z01.818 ENCOUNTER FOR OTHER PREPROCEDURAL EXAMINATION: ICD-10-CM

## 2021-07-09 ENCOUNTER — APPOINTMENT (OUTPATIENT)
Dept: DISASTER EMERGENCY | Facility: CLINIC | Age: 31
End: 2021-07-09

## 2021-07-14 DIAGNOSIS — E03.9 HYPOTHYROIDISM, UNSPECIFIED: ICD-10-CM

## 2021-07-14 DIAGNOSIS — F41.9 ANXIETY DISORDER, UNSPECIFIED: ICD-10-CM

## 2021-07-14 DIAGNOSIS — O36.63X0 MATERNAL CARE FOR EXCESSIVE FETAL GROWTH, THIRD TRIMESTER, NOT APPLICABLE OR UNSPECIFIED: ICD-10-CM

## 2023-11-03 ENCOUNTER — NON-APPOINTMENT (OUTPATIENT)
Age: 33
End: 2023-11-03

## 2023-11-06 ENCOUNTER — APPOINTMENT (OUTPATIENT)
Dept: OPHTHALMOLOGY | Facility: CLINIC | Age: 33
End: 2023-11-06

## 2023-11-06 ENCOUNTER — OFFICE (OUTPATIENT)
Dept: URBAN - METROPOLITAN AREA CLINIC 113 | Facility: CLINIC | Age: 33
Setting detail: OPHTHALMOLOGY
End: 2023-11-06
Payer: COMMERCIAL

## 2023-11-06 DIAGNOSIS — B30.9: ICD-10-CM

## 2023-11-06 PROCEDURE — 99203 OFFICE O/P NEW LOW 30 MIN: CPT | Performed by: STUDENT IN AN ORGANIZED HEALTH CARE EDUCATION/TRAINING PROGRAM

## 2023-11-06 ASSESSMENT — CONFRONTATIONAL VISUAL FIELD TEST (CVF)
OS_FINDINGS: FULL
OD_FINDINGS: FULL

## 2025-01-28 ENCOUNTER — NON-APPOINTMENT (OUTPATIENT)
Age: 35
End: 2025-01-28

## 2025-08-03 ENCOUNTER — NON-APPOINTMENT (OUTPATIENT)
Age: 35
End: 2025-08-03